# Patient Record
Sex: MALE | Race: WHITE | NOT HISPANIC OR LATINO | Employment: OTHER | URBAN - METROPOLITAN AREA
[De-identification: names, ages, dates, MRNs, and addresses within clinical notes are randomized per-mention and may not be internally consistent; named-entity substitution may affect disease eponyms.]

---

## 2017-07-28 ENCOUNTER — ALLSCRIPTS OFFICE VISIT (OUTPATIENT)
Dept: OTHER | Facility: OTHER | Age: 82
End: 2017-07-28

## 2017-07-28 LAB
BILIRUB UR QL STRIP: NORMAL
CLARITY UR: NORMAL
COLOR UR: YELLOW
GLUCOSE (HISTORICAL): NORMAL
HGB UR QL STRIP.AUTO: NORMAL
KETONES UR STRIP-MCNC: NORMAL MG/DL
LEUKOCYTE ESTERASE UR QL STRIP: NORMAL
NITRITE UR QL STRIP: NORMAL
PH UR STRIP.AUTO: 7 [PH]
PROT UR STRIP-MCNC: NORMAL MG/DL
SP GR UR STRIP.AUTO: 1.01
UROBILINOGEN UR QL STRIP.AUTO: 0.2

## 2018-01-04 DIAGNOSIS — Z85.46 PERSONAL HISTORY OF MALIGNANT NEOPLASM OF PROSTATE: ICD-10-CM

## 2018-01-13 VITALS
BODY MASS INDEX: 23.38 KG/M2 | WEIGHT: 167 LBS | SYSTOLIC BLOOD PRESSURE: 120 MMHG | HEIGHT: 71 IN | DIASTOLIC BLOOD PRESSURE: 64 MMHG

## 2018-07-25 RX ORDER — ALLOPURINOL 300 MG/1
1 TABLET ORAL DAILY
COMMUNITY

## 2018-07-25 RX ORDER — DIAPER,BRIEF,INFANT-TODD,DISP
EACH MISCELLANEOUS 3 TIMES DAILY
COMMUNITY
Start: 2017-07-28 | End: 2018-08-01 | Stop reason: SDUPTHER

## 2018-07-25 RX ORDER — OMEPRAZOLE 20 MG/1
1 CAPSULE, DELAYED RELEASE ORAL DAILY
COMMUNITY
End: 2019-08-29 | Stop reason: ALTCHOICE

## 2018-07-25 RX ORDER — SENNA PLUS 8.6 MG/1
TABLET ORAL
COMMUNITY

## 2018-07-25 RX ORDER — SILODOSIN 8 MG/1
1 CAPSULE ORAL
COMMUNITY
Start: 2017-07-28 | End: 2018-08-01 | Stop reason: SDUPTHER

## 2018-07-31 RX ORDER — CLOTRIMAZOLE AND BETAMETHASONE DIPROPIONATE 10; .64 MG/G; MG/G
CREAM TOPICAL
Refills: 0 | COMMUNITY
Start: 2018-07-03

## 2018-08-01 ENCOUNTER — OFFICE VISIT (OUTPATIENT)
Dept: UROLOGY | Facility: MEDICAL CENTER | Age: 83
End: 2018-08-01
Payer: MEDICARE

## 2018-08-01 VITALS
WEIGHT: 170 LBS | DIASTOLIC BLOOD PRESSURE: 62 MMHG | BODY MASS INDEX: 23.8 KG/M2 | SYSTOLIC BLOOD PRESSURE: 112 MMHG | HEIGHT: 71 IN

## 2018-08-01 DIAGNOSIS — N40.1 BENIGN PROSTATIC HYPERPLASIA WITH LOWER URINARY TRACT SYMPTOMS, SYMPTOM DETAILS UNSPECIFIED: ICD-10-CM

## 2018-08-01 DIAGNOSIS — Z85.46 PERSONAL HISTORY OF MALIGNANT NEOPLASM OF PROSTATE: Primary | ICD-10-CM

## 2018-08-01 DIAGNOSIS — Z92.3 PERSONAL HISTORY OF IRRADIATION: ICD-10-CM

## 2018-08-01 LAB
SL AMB  POCT GLUCOSE, UA: NORMAL
SL AMB LEUKOCYTE ESTERASE,UA: NORMAL
SL AMB POCT BILIRUBIN,UA: NORMAL
SL AMB POCT BLOOD,UA: NORMAL
SL AMB POCT CLARITY,UA: CLEAR
SL AMB POCT COLOR,UA: YELLOW
SL AMB POCT KETONES,UA: NORMAL
SL AMB POCT NITRITE,UA: NORMAL
SL AMB POCT PH,UA: 7
SL AMB POCT SPECIFIC GRAVITY,UA: 1.01
SL AMB POCT URINE PROTEIN: NORMAL
SL AMB POCT UROBILINOGEN: 0.2

## 2018-08-01 PROCEDURE — 81003 URINALYSIS AUTO W/O SCOPE: CPT | Performed by: UROLOGY

## 2018-08-01 PROCEDURE — 99214 OFFICE O/P EST MOD 30 MIN: CPT | Performed by: UROLOGY

## 2018-08-01 RX ORDER — SILODOSIN 8 MG/1
1 CAPSULE ORAL
Qty: 90 CAPSULE | Refills: 3 | Status: SHIPPED | OUTPATIENT
Start: 2018-08-01 | End: 2019-04-18

## 2018-08-01 RX ORDER — DIAPER,BRIEF,INFANT-TODD,DISP
EACH MISCELLANEOUS 3 TIMES DAILY
Qty: 30 G | Refills: 11 | Status: SHIPPED | OUTPATIENT
Start: 2018-08-01 | End: 2019-08-29 | Stop reason: SDUPTHER

## 2018-08-01 NOTE — PROGRESS NOTES
Assessment/Plan:      Diagnoses and all orders for this visit:    Personal history of malignant neoplasm of prostate  -     POCT urine dip auto non-scope  -     PSA Total, Diagnostic; Future  -     hydrocortisone 0 5 % ointment; Apply topically 3 (three) times a day    Personal history of irradiation  -     hydrocortisone 0 5 % ointment; Apply topically 3 (three) times a day    Benign prostatic hyperplasia with lower urinary tract symptoms, symptom details unspecified  -     Silodosin (RAPAFLO) 8 MG CAPS; Take 1 capsule by mouth daily with dinner    Other orders  -     clotrimazole-betamethasone (LOTRISONE) 1-0 05 % cream;           Subjective:  No complaints     Patient ID: Adalgisa Verdugo is a 80 y o  male  HPI  He is 6 years after completion of his external beam radiation therapy, combined with 1 year of ADT, for localized prostate cancer  He is no longer taking the ADT  His PSA blood tests have been low since his prostate cancer treatment was completed  He denies any urinary symptoms, no urinary incontinence or abnormal sensation when needing to urinate  He does not have to bear down or strain to start his urinary stream   He denies any pain in new locations, nor has he had any recent unwanted weight loss  He has a good appetite and bowel function is normal     He has history of BPH for which he is on Rapaflo  He is not having any difficulty emptying his bladder nor does he have any abnormal sensations, dysuria or hematuria  He does have a little bit of postvoid dribbling which does not bother him much  Review of Systems   Constitutional: Negative  HENT: Negative  Eyes: Negative  Respiratory: Negative  Cardiovascular: Negative  Gastrointestinal: Negative  Endocrine: Negative  Genitourinary: Negative  Musculoskeletal: Negative  Skin: Negative  Allergic/Immunologic: Negative  Neurological: Negative  Hematological: Negative  Psychiatric/Behavioral: Negative  Objective:     Physical Exam   Constitutional: He is oriented to person, place, and time  He appears well-developed and well-nourished  No distress  HENT:   Head: Normocephalic and atraumatic  Nose: Nose normal    Mouth/Throat: Oropharynx is clear and moist    Eyes: Conjunctivae and EOM are normal  Pupils are equal, round, and reactive to light  No scleral icterus  Neck: Normal range of motion  Neck supple  Cardiovascular: Normal rate, regular rhythm, normal heart sounds and intact distal pulses  No murmur heard  Pulmonary/Chest: Effort normal and breath sounds normal  No respiratory distress  He has no wheezes  He has no rales  Abdominal: Soft  Bowel sounds are normal  He exhibits no distension and no mass  There is no tenderness  Musculoskeletal: Normal range of motion  He exhibits no edema or tenderness  Lymphadenopathy:     He has no cervical adenopathy  Neurological: He is alert and oriented to person, place, and time  No cranial nerve deficit  Skin: Skin is warm and dry  No rash noted  No erythema  No pallor  Psychiatric: He has a normal mood and affect  His behavior is normal  Judgment and thought content normal    Nursing note and vitals reviewed        PSA from 07/19/2018 is 0 21

## 2019-04-18 DIAGNOSIS — Z85.46 PERSONAL HISTORY OF MALIGNANT NEOPLASM OF PROSTATE: ICD-10-CM

## 2019-04-18 DIAGNOSIS — Z92.3 PERSONAL HISTORY OF IRRADIATION: ICD-10-CM

## 2019-04-18 DIAGNOSIS — N40.1 BENIGN PROSTATIC HYPERPLASIA WITH LOWER URINARY TRACT SYMPTOMS, SYMPTOM DETAILS UNSPECIFIED: Primary | ICD-10-CM

## 2019-04-18 RX ORDER — SILODOSIN 8 MG/1
8 CAPSULE ORAL DAILY
Qty: 90 CAPSULE | Refills: 1 | Status: SHIPPED | OUTPATIENT
Start: 2019-04-18 | End: 2019-08-29 | Stop reason: ALTCHOICE

## 2019-08-29 ENCOUNTER — OFFICE VISIT (OUTPATIENT)
Dept: UROLOGY | Facility: MEDICAL CENTER | Age: 84
End: 2019-08-29
Payer: MEDICARE

## 2019-08-29 VITALS
DIASTOLIC BLOOD PRESSURE: 60 MMHG | HEIGHT: 71 IN | BODY MASS INDEX: 22.82 KG/M2 | HEART RATE: 53 BPM | SYSTOLIC BLOOD PRESSURE: 110 MMHG | WEIGHT: 163 LBS

## 2019-08-29 DIAGNOSIS — Z85.46 PERSONAL HISTORY OF MALIGNANT NEOPLASM OF PROSTATE: Primary | ICD-10-CM

## 2019-08-29 DIAGNOSIS — Z92.3 PERSONAL HISTORY OF IRRADIATION: ICD-10-CM

## 2019-08-29 DIAGNOSIS — N40.1 BENIGN PROSTATIC HYPERPLASIA WITH LOWER URINARY TRACT SYMPTOMS, SYMPTOM DETAILS UNSPECIFIED: ICD-10-CM

## 2019-08-29 PROCEDURE — 99214 OFFICE O/P EST MOD 30 MIN: CPT | Performed by: UROLOGY

## 2019-08-29 PROCEDURE — 81003 URINALYSIS AUTO W/O SCOPE: CPT | Performed by: UROLOGY

## 2019-08-29 RX ORDER — DIAPER,BRIEF,INFANT-TODD,DISP
EACH MISCELLANEOUS 3 TIMES DAILY
Qty: 30 G | Refills: 11 | Status: SHIPPED | OUTPATIENT
Start: 2019-08-29

## 2019-08-29 RX ORDER — SILODOSIN 8 MG/1
CAPSULE ORAL
COMMUNITY
End: 2019-08-29 | Stop reason: SDUPTHER

## 2019-08-29 RX ORDER — SILODOSIN 8 MG/1
8 CAPSULE ORAL
Qty: 90 CAPSULE | Refills: 3 | Status: SHIPPED | OUTPATIENT
Start: 2019-08-29 | End: 2020-02-25 | Stop reason: SDUPTHER

## 2019-08-29 NOTE — PROGRESS NOTES
Assessment/Plan:      Diagnoses and all orders for this visit:    Personal history of malignant neoplasm of prostate  -     POCT urine dip auto non-scope  -     PSA Total, Diagnostic; Future  -     PSA Total, Diagnostic; Future  -     hydrocortisone 0 5 % ointment; Apply topically 3 (three) times a day    Personal history of irradiation  -     hydrocortisone 0 5 % ointment; Apply topically 3 (three) times a day    Benign prostatic hyperplasia with lower urinary tract symptoms, symptom details unspecified  -     Silodosin (RAPAFLO) 8 MG CAPS; Take 1 capsule by mouth daily with dinner    Other orders  -     Discontinue: Silodosin (RAPAFLO) 8 MG CAPS; Take by mouth          Subjective:  No complaints     Patient ID: Jaison Howell is a 80 y o  male  HPI  He is 7 years after completion of his external beam radiation therapy, combined with 1 year of ADT, for localized prostate cancer  He is no longer taking the ADT  His PSA blood tests have been low since his prostate cancer treatment was completed  He denies any urinary symptoms, no urinary incontinence or abnormal sensation when needing to urinate  He does not have to bear down or strain to start his urinary stream   He denies any pain in new locations, nor has he had any recent unwanted weight loss  He has a good appetite and bowel function is normal      He has history of BPH for which he is on Rapaflo  He is not having any difficulty emptying his bladder nor does he have any abnormal sensations, dysuria or hematuria  He does have a little bit of postvoid dribbling which does not bother him much      Review of Systems   Constitutional: Negative  HENT: Negative  Eyes: Negative  Respiratory: Negative  Cardiovascular: Negative  Gastrointestinal: Negative  Endocrine: Negative  Genitourinary: Negative  Musculoskeletal: Negative  Skin: Negative  Allergic/Immunologic: Negative  Neurological: Negative  Hematological: Negative  Psychiatric/Behavioral: Negative  Objective:     Physical Exam   Constitutional: He is oriented to person, place, and time  He appears well-developed and well-nourished  No distress  HENT:   Head: Normocephalic and atraumatic  Nose: Nose normal    Mouth/Throat: Oropharynx is clear and moist    Eyes: Pupils are equal, round, and reactive to light  Conjunctivae and EOM are normal  No scleral icterus  Neck: Normal range of motion  Neck supple  Cardiovascular: Normal rate, regular rhythm, normal heart sounds and intact distal pulses  No murmur heard  Pulmonary/Chest: Effort normal and breath sounds normal  No respiratory distress  He has no wheezes  He has no rales  Abdominal: Soft  Bowel sounds are normal  He exhibits no distension and no mass  There is no tenderness  Musculoskeletal: Normal range of motion  He exhibits no edema or tenderness  Lymphadenopathy:     He has no cervical adenopathy  Neurological: He is alert and oriented to person, place, and time  No cranial nerve deficit  Skin: Skin is warm and dry  No rash noted  No erythema  No pallor  Psychiatric: He has a normal mood and affect  His behavior is normal  Judgment and thought content normal    Nursing note and vitals reviewed  PSA, TOTAL  (REPEAT DIAGNOSTIC)   Order: [id number]   (suggestion)  Information displayed in this report will not trend or trigger automated decision support      Ref Range & Units Value   PSA, Total <4 00 ng/mL 0 27    Comment:

## 2019-09-04 ENCOUNTER — TELEPHONE (OUTPATIENT)
Dept: UROLOGY | Facility: MEDICAL CENTER | Age: 84
End: 2019-09-04

## 2019-09-04 ENCOUNTER — TELEPHONE (OUTPATIENT)
Dept: UROLOGY | Facility: AMBULATORY SURGERY CENTER | Age: 84
End: 2019-09-04

## 2019-09-04 NOTE — TELEPHONE ENCOUNTER
We recently prescribed Sildosin (RAPAFLO) 8mg  A representative called from the patient pharmacy benefits inquiring about whether or not the patient has previously tried and/or failed Doxazosin or Alfuzosin  Our records do NOT indicate the patient has tried and/or failed either medication  This was the response given to the representative today  No further action required

## 2019-09-04 NOTE — TELEPHONE ENCOUNTER
Sildosin is denied with the insurance, patient needs to have tried and failed formulary alternative, see Karen Kate encounter about which two are formulary  Thanks  Garcia Luz

## 2019-09-04 NOTE — TELEPHONE ENCOUNTER
Louis Castro, [place]           10:24 AM   Note      We recently prescribed Sildosin (RAPAFLO) 8mg  A representative called from the patient pharmacy benefits inquiring about whether or not the patient has previously tried and/or failed Doxazosin or Alfuzosin  Our records do NOT indicate the patient has tried and/or failed either medication  This was the response given to the representative today  No further action required

## 2019-12-09 ENCOUNTER — TELEPHONE (OUTPATIENT)
Dept: UROLOGY | Facility: MEDICAL CENTER | Age: 84
End: 2019-12-09

## 2019-12-09 NOTE — TELEPHONE ENCOUNTER
Spoke with pt who states he had gross hematuria with large clots  Urine clear now  Denies s/s of UTI  Admitted he lifted something heavy prior this  Pt has hx of XRT for prostate cancer  Appt given in 2 days in office  Avoid exertion and hydrate well  Call again with s/s of UTI or inability to urinate

## 2019-12-09 NOTE — TELEPHONE ENCOUNTER
Patient of Dr Dee Velazquez seen in the [facility] office  Patient advised that he had blood in his urine on Saturday and cleared up in Sunday  Patient is concerned and would like to know what he should do because he has a history of prostate cancer and has never urinated blood before  Please advise

## 2019-12-11 ENCOUNTER — OFFICE VISIT (OUTPATIENT)
Dept: UROLOGY | Facility: MEDICAL CENTER | Age: 84
End: 2019-12-11
Payer: MEDICARE

## 2019-12-11 VITALS
WEIGHT: 172 LBS | DIASTOLIC BLOOD PRESSURE: 60 MMHG | HEART RATE: 60 BPM | SYSTOLIC BLOOD PRESSURE: 108 MMHG | HEIGHT: 71 IN | BODY MASS INDEX: 24.08 KG/M2

## 2019-12-11 DIAGNOSIS — C61 PROSTATE CANCER (HCC): ICD-10-CM

## 2019-12-11 DIAGNOSIS — R31.0 GROSS HEMATURIA: Primary | ICD-10-CM

## 2019-12-11 LAB
SL AMB  POCT GLUCOSE, UA: NORMAL
SL AMB LEUKOCYTE ESTERASE,UA: NORMAL
SL AMB POCT BILIRUBIN,UA: NORMAL
SL AMB POCT BLOOD,UA: NORMAL
SL AMB POCT CLARITY,UA: CLEAR
SL AMB POCT COLOR,UA: NORMAL
SL AMB POCT KETONES,UA: NORMAL
SL AMB POCT NITRITE,UA: NORMAL
SL AMB POCT PH,UA: 6.5
SL AMB POCT SPECIFIC GRAVITY,UA: 1.02
SL AMB POCT URINE PROTEIN: NORMAL
SL AMB POCT UROBILINOGEN: 0.2

## 2019-12-11 PROCEDURE — 99214 OFFICE O/P EST MOD 30 MIN: CPT | Performed by: UROLOGY

## 2019-12-11 PROCEDURE — 81003 URINALYSIS AUTO W/O SCOPE: CPT | Performed by: UROLOGY

## 2019-12-11 NOTE — PROGRESS NOTES
Assessment/Plan:      Diagnoses and all orders for this visit:    Gross hematuria  -     POCT urine dip auto non-scope  -     US kidney and bladder; Future  -     Cystoscopy; Future    Prostate cancer (HCC)  -     PSA Total, Diagnostic; Future      Personal history of irradiation     Benign prostatic hyperplasia with lower urinary tract symptoms, symptom details unspecified        Subjective:  episode hematuria earlier this week     Patient ID: Nupur Ascencio is a 80 y o  male  HPI  He is 7-1/3 years after completion of his external beam radiation therapy, combined with 1 year of ADT, for localized prostate cancer  [facility] is no longer taking the ADT   His PSA blood tests have been low since his prostate cancer treatment was completed       He experienced gross hematuria with clots earlier this week  He has since been voiding clear urine  He denies any dysuria, flank or abdominal pains, and denies any other urinary symptoms, no urinary incontinence or abnormal sensation when needing to urinate   He does not have to bear down or strain to start his urinary stream   He denies any pain in new locations, nor has he had any recent unwanted weight loss   He has a good appetite and bowel function is normal      He has history of BPH for which he is on Windell Bern is not having any difficulty emptying his bladder nor does he have any abnormal sensations, dysuria or hematuria   He does have a little bit of postvoid dribbling which does not bother him much  Review of Systems   Constitutional: Negative  HENT: Negative  Eyes: Negative  Respiratory: Negative  Cardiovascular: Negative  Gastrointestinal: Negative  Endocrine: Negative  Genitourinary: Positive for hematuria  Negative for difficulty urinating  Musculoskeletal: Negative  Skin: Negative  Allergic/Immunologic: Negative  Neurological: Negative  Hematological: Negative  Psychiatric/Behavioral: Negative            Objective: Physical Exam   Constitutional: He is oriented to person, place, and time  He appears well-developed and well-nourished  No distress  HENT:   Head: Normocephalic and atraumatic  Nose: Nose normal    Mouth/Throat: Oropharynx is clear and moist    Eyes: Pupils are equal, round, and reactive to light  Conjunctivae and EOM are normal  No scleral icterus  Neck: Normal range of motion  Neck supple  Cardiovascular: Normal rate, regular rhythm, normal heart sounds and intact distal pulses  No murmur heard  Pulmonary/Chest: Effort normal and breath sounds normal  No respiratory distress  He has no wheezes  He has no rales  Abdominal: Soft  Bowel sounds are normal  He exhibits no distension and no mass  There is no tenderness  Musculoskeletal: Normal range of motion  He exhibits no edema or tenderness  Lymphadenopathy:     He has no cervical adenopathy  Neurological: He is alert and oriented to person, place, and time  No cranial nerve deficit  Skin: Skin is warm and dry  No rash noted  No erythema  No pallor  Psychiatric: He has a normal mood and affect  His behavior is normal  Judgment and thought content normal    Nursing note and vitals reviewed            PSA from 10/25/2019 was 0 31

## 2019-12-13 ENCOUNTER — HOSPITAL ENCOUNTER (OUTPATIENT)
Dept: ULTRASOUND IMAGING | Facility: HOSPITAL | Age: 84
Discharge: HOME | End: 2019-12-13
Attending: UROLOGY
Payer: MEDICARE

## 2019-12-13 DIAGNOSIS — R31.0 GROSS HEMATURIA: ICD-10-CM

## 2019-12-13 PROCEDURE — 76770 US EXAM ABDO BACK WALL COMP: CPT

## 2019-12-20 ENCOUNTER — TELEPHONE (OUTPATIENT)
Dept: UROLOGY | Facility: AMBULATORY SURGERY CENTER | Age: 84
End: 2019-12-20

## 2019-12-20 NOTE — TELEPHONE ENCOUNTER
Patient managed by Cal Cosme is calling to get his results for ultrasound done on 12/13/19  Requesting a call back

## 2019-12-20 NOTE — TELEPHONE ENCOUNTER
Tell patient ultrasound is negative/normal   He should follow up and see me as per previous scheduled

## 2019-12-20 NOTE — TELEPHONE ENCOUNTER
Called patient and spoke with wife  She is told that the ultrasound was normal and he should keep his appointment in February

## 2020-02-05 ENCOUNTER — TELEPHONE (OUTPATIENT)
Dept: UROLOGY | Facility: MEDICAL CENTER | Age: 85
End: 2020-02-05

## 2020-02-05 NOTE — TELEPHONE ENCOUNTER
This is a patient of Dr Jill Kenyon in [place]  Patient is at the lab right now and the expected date is end of February  Please call Annabel Mckay at [facility] to see if patient can get this done  He is at the lab right now  The lab can be reached at [phone]

## 2020-02-25 ENCOUNTER — PROCEDURE VISIT (OUTPATIENT)
Dept: UROLOGY | Facility: MEDICAL CENTER | Age: 85
End: 2020-02-25
Payer: MEDICARE

## 2020-02-25 VITALS
DIASTOLIC BLOOD PRESSURE: 72 MMHG | HEIGHT: 71 IN | SYSTOLIC BLOOD PRESSURE: 110 MMHG | WEIGHT: 172 LBS | BODY MASS INDEX: 24.08 KG/M2 | HEART RATE: 61 BPM

## 2020-02-25 DIAGNOSIS — N40.1 BENIGN PROSTATIC HYPERPLASIA WITH LOWER URINARY TRACT SYMPTOMS, SYMPTOM DETAILS UNSPECIFIED: ICD-10-CM

## 2020-02-25 DIAGNOSIS — R31.0 GROSS HEMATURIA: Primary | ICD-10-CM

## 2020-02-25 DIAGNOSIS — C61 PROSTATE CANCER (HCC): ICD-10-CM

## 2020-02-25 DIAGNOSIS — Z92.3 HISTORY OF EXTERNAL BEAM RADIATION THERAPY: ICD-10-CM

## 2020-02-25 PROCEDURE — 99214 OFFICE O/P EST MOD 30 MIN: CPT | Performed by: UROLOGY

## 2020-02-25 PROCEDURE — 81003 URINALYSIS AUTO W/O SCOPE: CPT | Performed by: UROLOGY

## 2020-02-25 PROCEDURE — 52000 CYSTOURETHROSCOPY: CPT | Performed by: UROLOGY

## 2020-02-25 RX ORDER — SILODOSIN 8 MG/1
8 CAPSULE ORAL
Qty: 90 CAPSULE | Refills: 3 | Status: SHIPPED | OUTPATIENT
Start: 2020-02-25

## 2020-02-25 NOTE — PROGRESS NOTES
Assessment/Plan:      Diagnoses and all orders for this visit:    Gross hematuria  -     POCT urine dip auto non-scope  -     Cystoscopy    Prostate cancer ([facility] [address] )  -     PSA Total, Diagnostic; Future    History of external beam radiation therapy    Benign prostatic hyperplasia with lower urinary tract symptoms, symptom details unspecified  -     Silodosin (Rapaflo) 8 MG CAPS; Take 1 capsule by mouth daily with dinner  -     Cystoscopy        Assessment/Plan: The bleeding site of previous episode of hematuria was probably a ruptured blood vessel in his prostatic urethra  Will follow conservatively  Subjective:  No complaints     Patient ID: Miguel Wagner is a 80 y o  male  HPI  He is 7-1/2 years after completion of his external beam radiation therapy, combined with 1 year of ADT, for localized prostate cancer  [facility] is no longer taking the ADT   His PSA blood tests have been low since his prostate cancer treatment was completed        He experienced a single episode of total gross painless hematuria with clots about 2 months ago  He has since been voiding clear urine since with no symptoms  He denies any dysuria, flank or abdominal pains, and denies any other urinary symptoms, no urinary incontinence or abnormal sensation when needing to urinate   He does not have to bear down or strain to start his urinary stream   He denies any pain in new locations, nor has he had any recent unwanted weight loss   He has a good appetite and bowel function is normal      He has history of BPH for which he is on Becki Crock is not having any difficulty emptying his bladder nor does he have any abnormal sensations, dysuria or hematuria   He does have a little bit of postvoid dribbling which does not bother him much      Review of Systems   Constitutional: Negative  HENT: Negative  Eyes: Negative  Respiratory: Negative  Cardiovascular: Negative  Gastrointestinal: Negative  Endocrine: Negative      Genitourinary: Negative  Musculoskeletal: Negative  Skin: Negative  Allergic/Immunologic: Negative  Neurological: Negative  Hematological: Negative  Psychiatric/Behavioral: Negative  Objective:     Physical Exam   Constitutional: He is oriented to person, place, and time  He appears well-developed and well-nourished  No distress  HENT:   Head: Normocephalic and atraumatic  Nose: Nose normal    Mouth/Throat: Oropharynx is clear and moist    Eyes: Pupils are equal, round, and reactive to light  Conjunctivae and EOM are normal  No scleral icterus  Neck: Normal range of motion  Neck supple  Cardiovascular: Normal rate, regular rhythm, normal heart sounds and intact distal pulses  No murmur heard  Pulmonary/Chest: Effort normal and breath sounds normal  No respiratory distress  He has no wheezes  He has no rales  Abdominal: Soft  Bowel sounds are normal  He exhibits no distension and no mass  There is no tenderness  Musculoskeletal: Normal range of motion  He exhibits no edema or tenderness  Lymphadenopathy:     He has no cervical adenopathy  Neurological: He is alert and oriented to person, place, and time  No cranial nerve deficit  Skin: Skin is warm and dry  No rash noted  No erythema  No pallor  Psychiatric: He has a normal mood and affect  His behavior is normal  Judgment and thought content normal    Nursing note and vitals reviewed        PSA, TOTAL  (REPEAT DIAGNOSTIC)   Order: [id number]       Ref Range & Units 2/5/20  2:08 PM   PSA, Total <4 00 ng/mL 0 38     Renal ultrasound from 12/13/2019:  IMPRESSION:  Unremarkable renal ultrasound without nephrolithiasis        Refer to cystoscopy procedure note

## 2020-02-25 NOTE — PROGRESS NOTES
Cystoscopy  Date/Time: 2/25/2020 10:44 AM  Performed by: Mireille Sullivan MD  Authorized by: Mireille Sullivan MD     Procedure details: cystoscopy    Patient tolerance: Patient tolerated the procedure well with no immediate complications        The patient was placed supine on the procedure table and prepped and draped in penoscrotal area in the usual manner  A lubricated 16 [place] flexible cystoscope was inserted per urethra, where there was no evidence of strictures, lesions, or obstruction  His prostatic urethra was minimally occlusive with some dilated blood vessels in the prostatic mucosa though none were bleeding at the time of the cystoscope     The bladder was entered and pan cystoscoped  There were no tumors, stones, or diverticuli seen  There was some slight radiation type hemorrhagic cystitis changes to the trigone mucosa  , and there was 2/4 bladder wall trabeculation  His bilateral ureteral orifices were in their normal position and orientation, with clear efflux of urine  The cystoscope was removed, and he tolerated procedure well  The bleeding site of previous episode of hematuria was probably a ruptured blood vessel in his prostatic urethra

## 2020-08-31 ENCOUNTER — OFFICE VISIT (OUTPATIENT)
Dept: UROLOGY | Facility: MEDICAL CENTER | Age: 85
End: 2020-08-31
Payer: MEDICARE

## 2020-08-31 VITALS
HEART RATE: 59 BPM | TEMPERATURE: 97.8 F | BODY MASS INDEX: 22.96 KG/M2 | HEIGHT: 71 IN | DIASTOLIC BLOOD PRESSURE: 68 MMHG | SYSTOLIC BLOOD PRESSURE: 130 MMHG | WEIGHT: 164 LBS

## 2020-08-31 DIAGNOSIS — Z87.448 HISTORY OF GROSS HEMATURIA: ICD-10-CM

## 2020-08-31 DIAGNOSIS — R35.1 NOCTURIA MORE THAN TWICE PER NIGHT: ICD-10-CM

## 2020-08-31 DIAGNOSIS — C61 PROSTATE CANCER (HCC): Primary | ICD-10-CM

## 2020-08-31 DIAGNOSIS — N40.1 BENIGN PROSTATIC HYPERPLASIA WITH LOWER URINARY TRACT SYMPTOMS, SYMPTOM DETAILS UNSPECIFIED: ICD-10-CM

## 2020-08-31 DIAGNOSIS — Z92.3 HISTORY OF EXTERNAL BEAM RADIATION THERAPY: ICD-10-CM

## 2020-08-31 DIAGNOSIS — N30.40 RADIATION CYSTITIS: ICD-10-CM

## 2020-08-31 PROCEDURE — 99214 OFFICE O/P EST MOD 30 MIN: CPT | Performed by: UROLOGY

## 2020-08-31 RX ORDER — LOTEPREDNOL ETABONATE 5 MG/G
GEL OPHTHALMIC
COMMUNITY
Start: 2020-08-27

## 2020-08-31 RX ORDER — SOLIFENACIN SUCCINATE 5 MG/1
5 TABLET, FILM COATED ORAL
Qty: 30 TABLET | Refills: 6 | Status: SHIPPED | OUTPATIENT
Start: 2020-08-31

## 2020-08-31 NOTE — PROGRESS NOTES
Assessment/Plan:      Diagnoses and all orders for this visit:    Prostate cancer ([facility] [address] )  -     PSA Total, Diagnostic; Future    Benign prostatic hyperplasia with lower urinary tract symptoms, symptom details unspecified    History of external beam radiation therapy    History of gross hematuria    Radiation cystitis    Nocturia more than twice per night  -     solifenacin (VESICARE) 5 mg tablet; Take 1 tablet (5 mg total) by mouth daily at bedtime    Other orders  -     Lotemax 0 5 % ophth gel; instill 1 drop into right eye as directed BEFORE LASER then twice a day for 4 days AFTER LASER          Subjective:  No complaints     Patient ID: Rashard Johnson is a 80 y o  male  HPI  He is 8 years after completion of his external beam radiation therapy, combined with 1 year of ADT, for localized prostate cancer  [facility] is no longer taking the ADT   His PSA blood tests have been low since his prostate cancer treatment was completed        He had some hematuria last year the workup of which was negative besides his BPH and radiation cystitis type changes to his bladder   He has since been voiding clear urine since with no symptoms   He has been having more nocturia several times per night as of late, but denies any dysuria, flank or abdominal pains   He does not have to bear down or strain to start his urinary stream   He denies any pain in new locations, nor has he had any recent unwanted weight loss  Maria Guadalupe Newell has a good appetite and bowel function is normal      He has history of BPH for which he is on Quilla Flood is not having any difficulty emptying his bladder nor does he have any abnormal sensations, dysuria or hematuria   He does have a little bit of postvoid dribbling which does not bother him much      Review of Systems   Constitutional: Negative  HENT: Negative  Eyes: Negative  Respiratory: Negative  Cardiovascular: Negative  Gastrointestinal: Negative  Endocrine: Negative  Genitourinary: Negative  Negative for hematuria  Musculoskeletal: Negative  Skin: Negative  Allergic/Immunologic: Negative  Neurological: Negative  Hematological: Negative  Psychiatric/Behavioral: Negative  Objective:     Physical Exam  Vitals signs and nursing note reviewed  Constitutional:       General: He is not in acute distress  Appearance: He is well-developed  HENT:      Head: Normocephalic and atraumatic  Eyes:      General: No scleral icterus  Conjunctiva/sclera: Conjunctivae normal       Pupils: Pupils are equal, round, and reactive to light  Neck:      Musculoskeletal: Normal range of motion  Pulmonary:      Effort: Pulmonary effort is normal  No respiratory distress  Breath sounds: Normal breath sounds  Abdominal:      General: Bowel sounds are normal  There is no distension  Palpations: Abdomen is soft  There is no mass  Tenderness: There is no abdominal tenderness  Musculoskeletal: Normal range of motion  General: No tenderness  Skin:     General: Skin is warm and dry  Coloration: Skin is not pale  Findings: No erythema or rash  Neurological:      Mental Status: He is alert and oriented to person, place, and time  Cranial Nerves: No cranial nerve deficit  Psychiatric:         Behavior: Behavior normal          Thought Content:  Thought content normal          Judgment: Judgment normal          PSA, TOTAL  (REPEAT DIAGNOSTIC)    Ref Range & Units  8/26/20  1:28 PM    PSA, Total  <4 00 ng/mL  0 70

## 2021-01-20 DIAGNOSIS — Z23 ENCOUNTER FOR IMMUNIZATION: ICD-10-CM

## 2021-01-28 ENCOUNTER — IMMUNIZATIONS (OUTPATIENT)
Dept: FAMILY MEDICINE CLINIC | Facility: HOSPITAL | Age: 86
End: 2021-01-28

## 2021-01-28 DIAGNOSIS — Z23 ENCOUNTER FOR IMMUNIZATION: Primary | ICD-10-CM

## 2021-01-28 PROCEDURE — 91301 SARS-COV-2 / COVID-19 MRNA VACCINE (MODERNA) 100 MCG: CPT

## 2021-01-28 PROCEDURE — 0011A SARS-COV-2 / COVID-19 MRNA VACCINE (MODERNA) 100 MCG: CPT

## 2021-02-25 ENCOUNTER — IMMUNIZATIONS (OUTPATIENT)
Dept: FAMILY MEDICINE CLINIC | Facility: HOSPITAL | Age: 86
End: 2021-02-25

## 2021-02-25 DIAGNOSIS — Z23 ENCOUNTER FOR IMMUNIZATION: Primary | ICD-10-CM

## 2021-02-25 PROCEDURE — 91301 SARS-COV-2 / COVID-19 MRNA VACCINE (MODERNA) 100 MCG: CPT

## 2021-02-25 PROCEDURE — 0012A SARS-COV-2 / COVID-19 MRNA VACCINE (MODERNA) 100 MCG: CPT

## 2021-03-08 ENCOUNTER — OFFICE VISIT (OUTPATIENT)
Dept: UROLOGY | Facility: MEDICAL CENTER | Age: 86
End: 2021-03-08
Payer: MEDICARE

## 2021-03-08 VITALS
HEIGHT: 71 IN | BODY MASS INDEX: 23.66 KG/M2 | SYSTOLIC BLOOD PRESSURE: 110 MMHG | HEART RATE: 69 BPM | WEIGHT: 169 LBS | DIASTOLIC BLOOD PRESSURE: 70 MMHG

## 2021-03-08 DIAGNOSIS — C61 PROSTATE CANCER (HCC): Primary | ICD-10-CM

## 2021-03-08 PROCEDURE — 81003 URINALYSIS AUTO W/O SCOPE: CPT | Performed by: UROLOGY

## 2021-03-08 PROCEDURE — 99214 OFFICE O/P EST MOD 30 MIN: CPT | Performed by: UROLOGY

## 2021-03-08 NOTE — PROGRESS NOTES
HISTORY:     radiation therapy in 2012  With one year of Lupron for localized prostate cancer  PSA was 0 38 in February 2020, rising since then  August 2020, PSA 0 7   March 2021, PSA 1 2    No urinary symptoms good stream and control  ASSESSMENT / PLAN:      PSA rising faster over the past year than before  Will check again in six months   We discussed hormone therapy if necessary, based upon much more rapid rise in PSA  Patient comfortable observing for now  The following portions of the patient's history were reviewed and updated as appropriate: allergies, current medications, past family history, past medical history, past social history, past surgical history and problem list     Review of Systems   All other systems reviewed and are negative  Objective:     Physical Exam  Constitutional:       General: He is not in acute distress  Appearance: He is well-developed  He is not diaphoretic  HENT:      Head: Normocephalic and atraumatic  Eyes:      General: No scleral icterus  Pulmonary:      Effort: Pulmonary effort is normal    Genitourinary:     Comments:   Penis testes normal     Prostate minimally enlarged no nodules  Skin:     Coloration: Skin is not pale  Neurological:      Mental Status: He is alert and oriented to person, place, and time  Psychiatric:         Behavior: Behavior normal          Thought Content: Thought content normal          Judgment: Judgment normal            No results found for: PSA]  No results found for: BUN  No results found for: CREATININE  No components found for: CBC      There is no problem list on file for this patient  Diagnoses and all orders for this visit:    Prostate cancer ([address] )  -     POCT urine dip auto non-scope  -     PSA Total, Diagnostic; Future  -     hydrocortisone 2 5 % cream; Apply topically 2 (two) times a day           Patient ID: Tony Alexander is a 80 y o  male        Current Outpatient Medications:     allopurinol (ZYLOPRIM) 300 mg tablet, Take 1 tablet by mouth daily, Disp: , Rfl:     bimatoprost (LUMIGAN) 0 01 % ophthalmic drops, Apply to eye Daily, Disp: , Rfl:     Carboxymeth-Glycerin-Polysorb (REFRESH OPTIVE ADVANCED) 0 5-1-0 5 % SOLN, Apply to eye Twice daily, Disp: , Rfl:     clotrimazole-betamethasone (LOTRISONE) 1-0 05 % cream, , Disp: , Rfl: 0    hydrocortisone 0 5 % ointment, Apply topically 3 (three) times a day (Patient taking differently: Apply topically 3 (three) times a day ), Disp: 30 g, Rfl: 11    senna (SENOKOT) 8 6 MG tablet, Take by mouth, Disp: , Rfl:     Silodosin (Rapaflo) 8 MG CAPS, Take 1 capsule by mouth daily with dinner, Disp: 90 capsule, Rfl: 3    solifenacin (VESICARE) 5 mg tablet, Take 1 tablet (5 mg total) by mouth daily at bedtime, Disp: 30 tablet, Rfl: 6    hydrocortisone 2 5 % cream, Apply topically 2 (two) times a day, Disp: 30 g, Rfl: 2    Lotemax 0 5 % ophth gel, instill 1 drop into right eye as directed BEFORE LASER then twice a day for 4 days AFTER LASER, Disp: , Rfl:     Past Medical History:   Diagnosis Date    BPH with obstruction/lower urinary tract symptoms     Frequency of micturition     Nocturia     Other microscopic hematuria     Other retention of urine     Personal history of irradiation     Personal history of malignant neoplasm of prostate        Past Surgical History:   Procedure Laterality Date    CYSTOSCOPY      HERNIA REPAIR  1993    INTRAOPERATIVE RADIATION THERAPY ([facility])  2012    PROSTATE BIOPSY  2012    TRANSURETHRAL RESECTION OF PROSTATE  1993       Social History